# Patient Record
Sex: MALE | Race: WHITE | NOT HISPANIC OR LATINO | Employment: OTHER | URBAN - NONMETROPOLITAN AREA
[De-identification: names, ages, dates, MRNs, and addresses within clinical notes are randomized per-mention and may not be internally consistent; named-entity substitution may affect disease eponyms.]

---

## 2023-11-05 PROBLEM — E66.9 CLASS 2 OBESITY WITH BODY MASS INDEX (BMI) OF 36.0 TO 36.9 IN ADULT: Status: ACTIVE | Noted: 2023-11-05

## 2023-11-05 PROBLEM — M06.9 RHEUMATOID ARTHRITIS (MULTI): Status: ACTIVE | Noted: 2023-11-05

## 2023-11-05 PROBLEM — I10 ESSENTIAL HYPERTENSION: Status: ACTIVE | Noted: 2023-11-05

## 2023-11-05 PROBLEM — E66.812 CLASS 2 OBESITY WITH BODY MASS INDEX (BMI) OF 36.0 TO 36.9 IN ADULT: Status: ACTIVE | Noted: 2023-11-05

## 2023-11-05 PROBLEM — I31.9 PERICARDITIS (HHS-HCC): Status: ACTIVE | Noted: 2023-11-05

## 2023-11-05 PROBLEM — E78.5 HYPERLIPIDEMIA: Status: ACTIVE | Noted: 2023-11-05

## 2023-11-05 RX ORDER — ROSUVASTATIN CALCIUM 40 MG/1
40 TABLET, COATED ORAL DAILY
COMMUNITY
Start: 2021-10-20 | End: 2024-02-27 | Stop reason: ALTCHOICE

## 2023-11-05 RX ORDER — CITALOPRAM 20 MG/1
20 TABLET, FILM COATED ORAL DAILY
COMMUNITY

## 2023-11-05 RX ORDER — VERAPAMIL HYDROCHLORIDE 80 MG/1
1 TABLET ORAL 3 TIMES DAILY
COMMUNITY
Start: 2021-10-20 | End: 2024-02-27 | Stop reason: ALTCHOICE

## 2023-11-05 RX ORDER — GLUCOSAM/CHONDRO/HERB 149/HYAL 750-100 MG
2 TABLET ORAL 2 TIMES DAILY
COMMUNITY

## 2023-11-05 RX ORDER — LISINOPRIL 20 MG/1
1 TABLET ORAL DAILY
COMMUNITY
Start: 2021-10-20 | End: 2024-02-27 | Stop reason: DRUGHIGH

## 2023-11-05 RX ORDER — OMEPRAZOLE 20 MG/1
20 CAPSULE, DELAYED RELEASE ORAL
COMMUNITY
Start: 2021-10-20

## 2023-11-05 RX ORDER — BUSPIRONE HYDROCHLORIDE 10 MG/1
10 TABLET ORAL 3 TIMES DAILY
COMMUNITY

## 2023-11-05 RX ORDER — ALLOPURINOL 100 MG/1
1 TABLET ORAL DAILY
COMMUNITY
Start: 2021-10-20

## 2023-11-05 RX ORDER — NABUMETONE 750 MG/1
1 TABLET, FILM COATED ORAL EVERY 12 HOURS
COMMUNITY
Start: 2021-10-20

## 2023-11-05 RX ORDER — FLUTICASONE PROPIONATE 50 MCG
2 SPRAY, SUSPENSION (ML) NASAL 2 TIMES DAILY
COMMUNITY
Start: 2021-10-20

## 2023-11-07 ENCOUNTER — OFFICE VISIT (OUTPATIENT)
Dept: CARDIOLOGY | Facility: CLINIC | Age: 68
End: 2023-11-07
Payer: MEDICARE

## 2023-11-07 VITALS
SYSTOLIC BLOOD PRESSURE: 142 MMHG | DIASTOLIC BLOOD PRESSURE: 88 MMHG | BODY MASS INDEX: 36.58 KG/M2 | HEART RATE: 76 BPM | HEIGHT: 69 IN | WEIGHT: 247 LBS

## 2023-11-07 DIAGNOSIS — E78.2 MIXED HYPERLIPIDEMIA: ICD-10-CM

## 2023-11-07 DIAGNOSIS — R06.02 SHORTNESS OF BREATH: Primary | ICD-10-CM

## 2023-11-07 DIAGNOSIS — I31.9 PERICARDITIS, UNSPECIFIED CHRONICITY, UNSPECIFIED TYPE (HHS-HCC): ICD-10-CM

## 2023-11-07 DIAGNOSIS — I10 ESSENTIAL HYPERTENSION: ICD-10-CM

## 2023-11-07 DIAGNOSIS — M06.9 RHEUMATOID ARTHRITIS, INVOLVING UNSPECIFIED SITE, UNSPECIFIED WHETHER RHEUMATOID FACTOR PRESENT (MULTI): ICD-10-CM

## 2023-11-07 PROBLEM — E66.9 CLASS 2 OBESITY WITH BODY MASS INDEX (BMI) OF 36.0 TO 36.9 IN ADULT: Status: RESOLVED | Noted: 2023-11-05 | Resolved: 2023-11-07

## 2023-11-07 PROBLEM — E66.812 CLASS 2 OBESITY WITH BODY MASS INDEX (BMI) OF 36.0 TO 36.9 IN ADULT: Status: RESOLVED | Noted: 2023-11-05 | Resolved: 2023-11-07

## 2023-11-07 PROCEDURE — 3077F SYST BP >= 140 MM HG: CPT | Performed by: INTERNAL MEDICINE

## 2023-11-07 PROCEDURE — 3079F DIAST BP 80-89 MM HG: CPT | Performed by: INTERNAL MEDICINE

## 2023-11-07 PROCEDURE — 1160F RVW MEDS BY RX/DR IN RCRD: CPT | Performed by: INTERNAL MEDICINE

## 2023-11-07 PROCEDURE — 3008F BODY MASS INDEX DOCD: CPT | Performed by: INTERNAL MEDICINE

## 2023-11-07 PROCEDURE — 1036F TOBACCO NON-USER: CPT | Performed by: INTERNAL MEDICINE

## 2023-11-07 PROCEDURE — 99214 OFFICE O/P EST MOD 30 MIN: CPT | Performed by: INTERNAL MEDICINE

## 2023-11-07 PROCEDURE — 1159F MED LIST DOCD IN RCRD: CPT | Performed by: INTERNAL MEDICINE

## 2023-11-07 RX ORDER — HYDROCHLOROTHIAZIDE 12.5 MG/1
12.5 CAPSULE ORAL EVERY MORNING
Qty: 90 CAPSULE | Refills: 3 | Status: SHIPPED | OUTPATIENT
Start: 2023-11-07 | End: 2024-11-06

## 2023-11-07 ASSESSMENT — ENCOUNTER SYMPTOMS: DYSPNEA ON EXERTION: 1

## 2023-11-07 NOTE — PROGRESS NOTES
"Subjective   Kip Mera is a 68 y.o. male       Chief Complaint    Follow-up          HPI   Patient is here for earlier follow-up to evaluate recent development of decreased size sinus and shortness of breath.  He had a history of pericarditis remotely with no recurrence.  History of hypertension and hyperlipidemia.  He recently noticed decreased exercise tolerance and dyspnea on exertion consistent with functional class II.  He denies chest pain.  His blood pressure seem to be slightly elevated.  He has gained about 8 pounds.  Recent chest x-ray was unremarkable.  He denies any anginal symptoms.    ASSESSMENT:      1.  Patient presenting with worsening shortness of breath and decrease exercise tolerance with prior remote history of pericarditis. No recurrence. We suspect this could be either viral or related to rheumatoid arthritis.   2. Hypertension, seems not well controlled  3. Hyperlipidemia,   4. Obesity with 8 pound weight gain  5. Rheumatoid arthritis. He is currently not receiving any treatment.       RECOMMENDATION:      1. The patient was counseled regarding losing weight, exercise, risk factor adjustment.  2. The patient was advised to start hydrochlorothiazide 12.5 mg daily to optimize blood pressure control and I advised him to continue to monitor his blood pressure  3. I reviewed with him his recent lab work and chest x-ray  4.  I suggested an echocardiogram due to his shortness of breath  5.  Follow-up in 6 to 8 weeks for reevaluation     Review of Systems   Cardiovascular:  Positive for dyspnea on exertion.   All other systems reviewed and are negative.         Visit Vitals  /88 (BP Location: Right arm, Patient Position: Sitting)   Pulse 76   Ht 1.753 m (5' 9\")   Wt 112 kg (247 lb)   BMI 36.48 kg/m²   Smoking Status Never   BSA 2.34 m²        Objective   Physical Exam  Constitutional:       Appearance: Normal appearance. He is normal weight.   HENT:      Nose: Nose normal.   Neck:      " Vascular: No carotid bruit.   Cardiovascular:      Rate and Rhythm: Normal rate.      Pulses: Normal pulses.      Heart sounds: Normal heart sounds.   Pulmonary:      Effort: Pulmonary effort is normal.   Abdominal:      General: Bowel sounds are normal.      Palpations: Abdomen is soft.   Genitourinary:     Rectum: Normal.   Musculoskeletal:         General: Normal range of motion.      Cervical back: Normal range of motion.      Right lower leg: No edema.      Left lower leg: No edema.   Skin:     General: Skin is warm and dry.   Neurological:      General: No focal deficit present.      Mental Status: He is alert.   Psychiatric:         Mood and Affect: Mood normal.         Behavior: Behavior normal.         Thought Content: Thought content normal.         Judgment: Judgment normal.         Current Medications    Current Outpatient Medications:     allopurinol (Zyloprim) 100 mg tablet, Take 1 tablet (100 mg) by mouth once daily., Disp: , Rfl:     busPIRone (Buspar) 10 mg tablet, Take 1 tablet (10 mg) by mouth 3 times a day., Disp: , Rfl:     citalopram (CeleXA) 20 mg tablet, Take 1 tablet (20 mg) by mouth once daily., Disp: , Rfl:     fluticasone (Flonase) 50 mcg/actuation nasal spray, Administer 2 sprays into each nostril twice a day., Disp: , Rfl:     lisinopril 20 mg tablet, Take 1 tablet (20 mg) by mouth once daily., Disp: , Rfl:     nabumetone (Relafen) 750 mg tablet, Take 1 tablet (750 mg) by mouth every 12 hours., Disp: , Rfl:     omega 3-dha-epa-fish oil (Fish OiL) 1,000 mg (120 mg-180 mg) capsule, Take 2 tablets by mouth 2 times a day., Disp: , Rfl:     omeprazole (PriLOSEC) 20 mg DR capsule, Take 1 capsule (20 mg) by mouth once daily in the morning. Take before meals., Disp: , Rfl:     rosuvastatin (Crestor) 40 mg tablet, Take 1 tablet (40 mg) by mouth once daily., Disp: , Rfl:     verapamil (Calan) 80 mg tablet, Take 1 tablet (80 mg) by mouth 3 times a day., Disp: , Rfl:     hydroCHLOROthiazide  (Microzide) 12.5 mg capsule, Take 1 capsule (12.5 mg) by mouth once daily in the morning., Disp: 90 capsule, Rfl: 3                     Assessment/Plan   1. Shortness of breath  Basic Metabolic Panel    Transthoracic Echo (TTE) Complete    hydroCHLOROthiazide (Microzide) 12.5 mg capsule      2. Pericarditis, unspecified chronicity, unspecified type        3. Essential hypertension  Basic Metabolic Panel    Transthoracic Echo (TTE) Complete    hydroCHLOROthiazide (Microzide) 12.5 mg capsule      4. Mixed hyperlipidemia        5. BMI 36.0-36.9,adult        6. Rheumatoid arthritis, involving unspecified site, unspecified whether rheumatoid factor present (CMS/HCC)

## 2023-11-20 LAB
NON-UH HIE ANION GAP: 12.9 (ref 6–15)
NON-UH HIE BLOOD UREA NITROGEN: 24 MG/DL (ref 7–25)
NON-UH HIE CALCIUM: 9.9 MG/DL (ref 8.6–10.3)
NON-UH HIE CARBON DIOXIDE: 32.1 MMOL/L (ref 21–31)
NON-UH HIE CHLORIDE: 97 MMOL/L (ref 98–107)
NON-UH HIE CREATININE: 1.2 MG/DL (ref 0.7–1.3)
NON-UH HIE ESTIMATED GFR: > 60
NON-UH HIE GLUCOSE: 94 MG/DL (ref 70–100)
NON-UH HIE POTASSIUM: 5 MMOL/L (ref 3.5–5.1)
NON-UH HIE SODIUM: 137 MMOL/L (ref 136–145)

## 2023-12-04 ENCOUNTER — HOSPITAL ENCOUNTER (OUTPATIENT)
Dept: CARDIOLOGY | Facility: CLINIC | Age: 68
Discharge: HOME | End: 2023-12-04
Payer: MEDICARE

## 2023-12-04 VITALS
SYSTOLIC BLOOD PRESSURE: 144 MMHG | BODY MASS INDEX: 36.58 KG/M2 | HEIGHT: 69 IN | DIASTOLIC BLOOD PRESSURE: 84 MMHG | WEIGHT: 247 LBS

## 2023-12-04 DIAGNOSIS — I10 ESSENTIAL HYPERTENSION: ICD-10-CM

## 2023-12-04 DIAGNOSIS — R06.02 SHORTNESS OF BREATH: ICD-10-CM

## 2023-12-04 PROCEDURE — 93306 TTE W/DOPPLER COMPLETE: CPT

## 2023-12-04 PROCEDURE — 93306 TTE W/DOPPLER COMPLETE: CPT | Performed by: INTERNAL MEDICINE

## 2023-12-09 LAB
AORTIC VALVE MEAN GRADIENT: 5
AORTIC VALVE PEAK VELOCITY: 1.61
AV PEAK GRADIENT: 10.4
AVA (PEAK VEL): 2.33
AVA (VTI): 2.65
EJECTION FRACTION APICAL 4 CHAMBER: 69.7
LEFT VENTRICLE INTERNAL DIMENSION DIASTOLE: 4.6 (ref 3.5–6)
LEFT VENTRICULAR OUTFLOW TRACT DIAMETER: 2.3
MITRAL VALVE E/A RATIO: 1.15
MITRAL VALVE E/E' RATIO: 7.3

## 2024-02-14 ENCOUNTER — DOCUMENTATION (OUTPATIENT)
Dept: CARDIOLOGY | Facility: CLINIC | Age: 69
End: 2024-02-14
Payer: MEDICARE

## 2024-02-15 ENCOUNTER — PATIENT OUTREACH (OUTPATIENT)
Dept: CARDIOLOGY | Facility: CLINIC | Age: 69
End: 2024-02-15
Payer: MEDICARE

## 2024-02-15 NOTE — PROGRESS NOTES
Discharge Facility: [facility]  Discharge Diagnosis: NSTEMI  Admission Date:  2/8/24  Discharge Date:  2/14/24    PCP Appointment Date: Encouraged to make an appt    Specialist Appointment Date: Dr Christensen 2/27/24    Hospital Encounter and Summary: Linked   See discharge assessment below for further details     Engagement  Call Start Time: 0916 (2/15/2024  9:22 AM)    Medications  Medications reviewed with patient/caregiver?: Yes (2/15/2024  9:22 AM)  Is the patient having any side effects they believe may be caused by any medication additions or changes?: No (2/15/2024  9:22 AM)  Does the patient have all medications ordered at discharge?: Yes (Picking up the rest this afternoon) (2/15/2024  9:22 AM)  Is the patient taking all medications as directed (includes completed medication regime)?: Yes (2/15/2024  9:22 AM)    Appointments  Does the patient have a primary care provider?: Yes (Encouraged patient to make an appt) (2/15/2024  9:22 AM)  Follow Up Tasks: Appointments (2/15/2024  9:22 AM)    Self Management  What is the home health agency?: n/a (2/15/2024  9:22 AM)  What Durable Medical Equipment (DME) was ordered?: n/a (2/15/2024  9:22 AM)    Patient Teaching  Does the patient have access to their discharge instructions?: Yes (2/15/2024  9:22 AM)  Care Management Interventions: Reviewed instructions with patient (2/15/2024  9:22 AM)  What is the patient's perception of their health status since discharge?: Improving (2/15/2024  9:22 AM)  Is the patient/caregiver able to teach back the hierarchy of who to call/visit for symptoms/problems? PCP, Specialist, Home Health nurse, Urgent Care, ED, 911: Yes (2/15/2024  9:22 AM)  Patient/Caregiver Education Comments: Reviewed monitoring incision site to right wrist, REviewed Brilinta (2/15/2024  9:22 AM)    Wrap Up  Wrap Up Additional Comments: Patient denies CP or SOB. He states his Right wrist looks better today and has no drainage--patient continues with bandage at  this time for comfort. Patient was given medications from the hospital and will  the rest. Patient is aware of appointment with Dr Christensen (2/15/2024  9:22 AM)  Call End Time: 0929 (2/15/2024  9:22 AM)

## 2024-02-27 ENCOUNTER — OFFICE VISIT (OUTPATIENT)
Dept: CARDIOLOGY | Facility: CLINIC | Age: 69
End: 2024-02-27
Payer: MEDICARE

## 2024-02-27 VITALS
BODY MASS INDEX: 36.73 KG/M2 | HEIGHT: 69 IN | WEIGHT: 248 LBS | DIASTOLIC BLOOD PRESSURE: 76 MMHG | SYSTOLIC BLOOD PRESSURE: 132 MMHG | HEART RATE: 68 BPM

## 2024-02-27 DIAGNOSIS — I31.9 PERICARDITIS, UNSPECIFIED CHRONICITY, UNSPECIFIED TYPE (HHS-HCC): ICD-10-CM

## 2024-02-27 DIAGNOSIS — R06.02 SHORTNESS OF BREATH: ICD-10-CM

## 2024-02-27 DIAGNOSIS — Z78.9 NEVER SMOKED TOBACCO: ICD-10-CM

## 2024-02-27 DIAGNOSIS — I10 ESSENTIAL HYPERTENSION: ICD-10-CM

## 2024-02-27 DIAGNOSIS — G47.30 SLEEP APNEA, UNSPECIFIED TYPE: ICD-10-CM

## 2024-02-27 DIAGNOSIS — I21.4 NON Q WAVE MYOCARDIAL INFARCTION (MULTI): ICD-10-CM

## 2024-02-27 DIAGNOSIS — E78.2 MIXED HYPERLIPIDEMIA: ICD-10-CM

## 2024-02-27 DIAGNOSIS — I25.10 SINGLE VESSEL CORONARY ARTERY DISEASE: Primary | ICD-10-CM

## 2024-02-27 PROCEDURE — 1036F TOBACCO NON-USER: CPT | Performed by: INTERNAL MEDICINE

## 2024-02-27 PROCEDURE — 3008F BODY MASS INDEX DOCD: CPT | Performed by: INTERNAL MEDICINE

## 2024-02-27 PROCEDURE — 99495 TRANSJ CARE MGMT MOD F2F 14D: CPT | Performed by: INTERNAL MEDICINE

## 2024-02-27 PROCEDURE — 1160F RVW MEDS BY RX/DR IN RCRD: CPT | Performed by: INTERNAL MEDICINE

## 2024-02-27 PROCEDURE — 3078F DIAST BP <80 MM HG: CPT | Performed by: INTERNAL MEDICINE

## 2024-02-27 PROCEDURE — 3075F SYST BP GE 130 - 139MM HG: CPT | Performed by: INTERNAL MEDICINE

## 2024-02-27 PROCEDURE — 1159F MED LIST DOCD IN RCRD: CPT | Performed by: INTERNAL MEDICINE

## 2024-02-27 RX ORDER — NITROGLYCERIN 0.4 MG/1
TABLET SUBLINGUAL
COMMUNITY
Start: 2024-02-14

## 2024-02-27 RX ORDER — LISINOPRIL 10 MG/1
10 TABLET ORAL DAILY
Qty: 90 TABLET | Refills: 3 | Status: SHIPPED | OUTPATIENT
Start: 2024-02-27 | End: 2025-02-26

## 2024-02-27 RX ORDER — NAPROXEN SODIUM 220 MG/1
81 TABLET, FILM COATED ORAL DAILY
COMMUNITY
Start: 2024-02-14

## 2024-02-27 RX ORDER — ATORVASTATIN CALCIUM 80 MG/1
80 TABLET, FILM COATED ORAL NIGHTLY
COMMUNITY
Start: 2024-02-14

## 2024-02-27 ASSESSMENT — ENCOUNTER SYMPTOMS
SHORTNESS OF BREATH: 1
DIZZINESS: 1

## 2024-02-27 NOTE — PROGRESS NOTES
Subjective   Kip Mera is a 68 y.o. male       Chief Complaint    Follow-up          HPI       Patient is here for follow-up continue management for recent hospitalization for acute coronary syndrome and finding of single-vessel coronary artery disease affecting the LAD.  I saw him recently for symptoms shortness of breath at that point of time he denies any chest pain.  His echocardiogram showed normal LV systolic function.  He presented subsequently with symptoms chest pain and non-Q wave myocardial infarction.  He underwent PCI to the LAD.  Postoperatively he continued to complain of chest pain and redo limited cardiac catheterization showed widely patent LAD stent.  Since his discharge he report mild shortness of breath but denies any chest pain.  I was concerned that some of his shortness of breath could be related to Brilinta.  Patient has been describing episodes of orthostatic hypotension      ASSESSMENT:      1.  Recent evaluation for shortness of breath and chest pain with recent hospitalization for acute coronary syndrome with finding of LAD disease underwent successful PCI to the LAD  2.  Remote history of pericarditis with no recurrence  2. Hypertension, seems to report symptoms of orthostatic hypotensio3  4. Hyperlipidemia, currently on high-dose high intensity statin  5. Obesity with no significant weight changes  5. Rheumatoid arthritis. He is currently not receiving any treatment.  6.  Mild shortness of breath could be related to Brilinta        RECOMMENDATION:      1. The patient was counseled regarding losing weight, exercise, risk factor adjustment.  2.  I advised him to cut down his lisinopril by half and to notify me if his lightheadedness and dizziness does not improve  3. I reviewed with him his recent hospitalization workup  4.  I suggested if his shortness of does not improve to notify me and we will switch him to Plavix  5.  I recommended cardiac rehab  6.  Follow-up in 3 to 4  "months  Review of Systems   Respiratory:  Positive for shortness of breath.    Neurological:  Positive for dizziness.   All other systems reviewed and are negative.           Vitals:    02/27/24 1538   BP: 132/76   BP Location: Right arm   Patient Position: Sitting   Pulse: 68   Weight: 112 kg (248 lb)   Height: 1.753 m (5' 9\")        Objective   Physical Exam  Constitutional:       Appearance: Normal appearance.   HENT:      Nose: Nose normal.   Neck:      Vascular: No carotid bruit.   Cardiovascular:      Rate and Rhythm: Normal rate.      Pulses: Normal pulses.      Heart sounds: Normal heart sounds.   Pulmonary:      Effort: Pulmonary effort is normal.   Abdominal:      General: Bowel sounds are normal.      Palpations: Abdomen is soft.   Musculoskeletal:         General: Normal range of motion.      Cervical back: Normal range of motion.      Right lower leg: No edema.      Left lower leg: No edema.   Skin:     General: Skin is warm and dry.   Neurological:      General: No focal deficit present.      Mental Status: He is alert.   Psychiatric:         Mood and Affect: Mood normal.         Behavior: Behavior normal.         Thought Content: Thought content normal.         Judgment: Judgment normal.         Allergies  Patient has no known allergies.     Current Medications    Current Outpatient Medications:     allopurinol (Zyloprim) 100 mg tablet, Take 1 tablet (100 mg) by mouth once daily., Disp: , Rfl:     aspirin 81 mg chewable tablet, 1 tablet (81 mg) once daily., Disp: , Rfl:     atorvastatin (Lipitor) 80 mg tablet, 1 tablet (80 mg) once daily at bedtime., Disp: , Rfl:     busPIRone (Buspar) 10 mg tablet, Take 1 tablet (10 mg) by mouth 3 times a day., Disp: , Rfl:     citalopram (CeleXA) 20 mg tablet, Take 1 tablet (20 mg) by mouth once daily., Disp: , Rfl:     fluticasone (Flonase) 50 mcg/actuation nasal spray, Administer 2 sprays into each nostril twice a day., Disp: , Rfl:     hydroCHLOROthiazide " (Microzide) 12.5 mg capsule, Take 1 capsule (12.5 mg) by mouth once daily in the morning., Disp: 90 capsule, Rfl: 3    nabumetone (Relafen) 750 mg tablet, Take 1 tablet (750 mg) by mouth every 12 hours., Disp: , Rfl:     nitroglycerin (Nitrostat) 0.4 mg SL tablet, Q5M, Disp: , Rfl:     omega 3-dha-epa-fish oil (Fish OiL) 1,000 mg (120 mg-180 mg) capsule, Take 2 tablets by mouth 2 times a day., Disp: , Rfl:     omeprazole (PriLOSEC) 20 mg DR capsule, Take 1 capsule (20 mg) by mouth once daily in the morning. Take before meals., Disp: , Rfl:     ticagrelor (Brilinta) 90 mg tablet, 1 tablet (90 mg) 2 times a day., Disp: , Rfl:     lisinopril 10 mg tablet, Take 1 tablet (10 mg) by mouth once daily., Disp: 90 tablet, Rfl: 3                     Assessment/Plan   1. Single vessel coronary artery disease  Follow Up In Cardiology    Referral to Cardiac Rehab      2. Essential hypertension  lisinopril 10 mg tablet      3. Mixed hyperlipidemia        4. Pericarditis, unspecified chronicity, unspecified type        5. BMI 36.0-36.9,adult        6. Shortness of breath        7. Non Q wave myocardial infarction (CMS/HCC)  Referral to Cardiac Rehab      8. Sleep apnea, unspecified type        9. Never smoked tobacco                 Scribe Attestation  By signing my name below, Iharjeet Scribe   attest that this documentation has been prepared under the direction and in the presence of Josie Choi MD.     Provider Attestation - Scribe documentation    All medical record entries made by the Scribe were at my direction and personally dictated by me. I have reviewed the chart and agree that the record accurately reflects my personal performance of the history, physical exam, discussion and plan.

## 2024-02-27 NOTE — PATIENT INSTRUCTIONS
Please bring all medicines, vitamins, and herbal supplements with you when you come to the office.    Prescriptions will not be filled unless you are compliant with your follow up appointments or have a follow up appointment scheduled as per instruction of your physician. Refills should be requested at the time of your visit.     BMI was above normal measurement. Current weight: 112 kg (248 lb)  Weight change since last visit (-) denotes wt loss 1 lbs   Weight loss needed to achieve BMI 25: 79.1 Lbs  Weight loss needed to achieve BMI 30: 45.3 Lbs  Provided instructions on dietary changes  Provided instructions on exercise.

## 2024-02-29 ENCOUNTER — PATIENT OUTREACH (OUTPATIENT)
Dept: CARDIOLOGY | Facility: CLINIC | Age: 69
End: 2024-02-29
Payer: MEDICARE

## 2024-02-29 NOTE — PROGRESS NOTES
Call regarding appt. with cardiology on 2/27/24 after hospitalization.  At time of outreach call the patient feels as if their condition has improved since last visit.  Reviewed the PCP appointment with the pt and addressed any questions or concerns.     Patient has reduced his lisinopril and continues with lightheadedness. Patient states it has only been one day, but if it continues he aware to call the office for another medication changed to possibly his Brilinta

## 2024-03-28 ENCOUNTER — PATIENT OUTREACH (OUTPATIENT)
Dept: CARDIOLOGY | Facility: CLINIC | Age: 69
End: 2024-03-28
Payer: MEDICARE

## 2024-03-28 NOTE — PROGRESS NOTES
Call placed regarding one month post discharge follow up call.  At time of outreach call the patient feels as if their condition has improved since initial visit with PCP or specialist.  Questions or concerns regarding recovery period addressed at this time.   Reviewed any PCP or specialists progress notes/labs/radiology reports if applicable and addressed any questions or concerns.   Reviewed if patient is still feeling lightheaded to call Dr Choi's office

## 2024-04-25 ENCOUNTER — PATIENT OUTREACH (OUTPATIENT)
Dept: CARDIOLOGY | Facility: CLINIC | Age: 69
End: 2024-04-25
Payer: MEDICARE

## 2024-04-25 NOTE — PROGRESS NOTES
90 day call  This CM called and spoke with patient to address any final questions or concerns regarding hospitalization. Patient reports doing well and has no further concerns

## 2024-05-03 ENCOUNTER — TELEPHONE (OUTPATIENT)
Dept: CARDIOLOGY | Facility: CLINIC | Age: 69
End: 2024-05-03
Payer: MEDICARE

## 2024-05-03 DIAGNOSIS — I21.4 NON Q WAVE MYOCARDIAL INFARCTION (MULTI): ICD-10-CM

## 2024-05-03 NOTE — TELEPHONE ENCOUNTER
Patient called to report that he is often SOB and he thinks its from the Brilinta and was inquiring on if there was something else he could take or if he could stop taking it.    Please advise

## 2024-05-08 RX ORDER — CLOPIDOGREL BISULFATE 75 MG/1
75 TABLET ORAL DAILY
Qty: 90 TABLET | Refills: 3 | Status: SHIPPED | OUTPATIENT
Start: 2024-05-08 | End: 2025-05-08

## 2024-05-08 RX ORDER — CLOPIDOGREL BISULFATE 300 MG/1
300 TABLET, FILM COATED ORAL ONCE
Qty: 1 TABLET | Refills: 0 | Status: SHIPPED | OUTPATIENT
Start: 2024-05-08 | End: 2024-05-08

## 2024-06-21 ENCOUNTER — APPOINTMENT (OUTPATIENT)
Dept: CARDIOLOGY | Facility: CLINIC | Age: 69
End: 2024-06-21
Payer: MEDICARE

## 2024-07-08 ENCOUNTER — APPOINTMENT (OUTPATIENT)
Dept: CARDIOLOGY | Facility: CLINIC | Age: 69
End: 2024-07-08
Payer: MEDICARE

## 2024-07-08 VITALS
SYSTOLIC BLOOD PRESSURE: 110 MMHG | HEART RATE: 80 BPM | DIASTOLIC BLOOD PRESSURE: 68 MMHG | WEIGHT: 240 LBS | BODY MASS INDEX: 35.55 KG/M2 | HEIGHT: 69 IN

## 2024-07-08 DIAGNOSIS — I25.10 SINGLE VESSEL CORONARY ARTERY DISEASE: ICD-10-CM

## 2024-07-08 DIAGNOSIS — I10 ESSENTIAL HYPERTENSION: Primary | ICD-10-CM

## 2024-07-08 DIAGNOSIS — E78.2 MIXED HYPERLIPIDEMIA: ICD-10-CM

## 2024-07-08 DIAGNOSIS — R06.02 SHORTNESS OF BREATH: ICD-10-CM

## 2024-07-08 DIAGNOSIS — I31.9 PERICARDITIS, UNSPECIFIED CHRONICITY, UNSPECIFIED TYPE (HHS-HCC): ICD-10-CM

## 2024-07-08 DIAGNOSIS — I21.4 NON Q WAVE MYOCARDIAL INFARCTION (MULTI): ICD-10-CM

## 2024-07-08 DIAGNOSIS — Z78.9 NEVER SMOKED TOBACCO: ICD-10-CM

## 2024-07-08 DIAGNOSIS — M06.9 RHEUMATOID ARTHRITIS, INVOLVING UNSPECIFIED SITE, UNSPECIFIED WHETHER RHEUMATOID FACTOR PRESENT (MULTI): ICD-10-CM

## 2024-07-08 DIAGNOSIS — G47.33 OBSTRUCTIVE SLEEP APNEA SYNDROME: ICD-10-CM

## 2024-07-08 PROCEDURE — 3008F BODY MASS INDEX DOCD: CPT | Performed by: INTERNAL MEDICINE

## 2024-07-08 PROCEDURE — 99214 OFFICE O/P EST MOD 30 MIN: CPT | Performed by: INTERNAL MEDICINE

## 2024-07-08 PROCEDURE — 1036F TOBACCO NON-USER: CPT | Performed by: INTERNAL MEDICINE

## 2024-07-08 PROCEDURE — 1159F MED LIST DOCD IN RCRD: CPT | Performed by: INTERNAL MEDICINE

## 2024-07-08 PROCEDURE — 3078F DIAST BP <80 MM HG: CPT | Performed by: INTERNAL MEDICINE

## 2024-07-08 PROCEDURE — 1160F RVW MEDS BY RX/DR IN RCRD: CPT | Performed by: INTERNAL MEDICINE

## 2024-07-08 PROCEDURE — 3074F SYST BP LT 130 MM HG: CPT | Performed by: INTERNAL MEDICINE

## 2024-07-08 RX ORDER — CARVEDILOL 6.25 MG/1
6.25 TABLET ORAL 2 TIMES DAILY
COMMUNITY
Start: 2024-06-14 | End: 2024-07-08 | Stop reason: SDUPTHER

## 2024-07-08 RX ORDER — CLOPIDOGREL BISULFATE 75 MG/1
75 TABLET ORAL DAILY
Qty: 90 TABLET | Refills: 3 | Status: SHIPPED | OUTPATIENT
Start: 2024-07-08 | End: 2025-07-08

## 2024-07-08 RX ORDER — CARVEDILOL 6.25 MG/1
6.25 TABLET ORAL 2 TIMES DAILY
Qty: 180 TABLET | Refills: 3 | Status: SHIPPED | OUTPATIENT
Start: 2024-07-08 | End: 2025-07-08

## 2024-07-08 RX ORDER — LISINOPRIL 10 MG/1
10 TABLET ORAL DAILY
Qty: 90 TABLET | Refills: 3 | Status: SHIPPED | OUTPATIENT
Start: 2024-07-08 | End: 2025-07-08

## 2024-07-08 RX ORDER — HYDROCHLOROTHIAZIDE 12.5 MG/1
12.5 CAPSULE ORAL EVERY MORNING
Qty: 90 CAPSULE | Refills: 3 | Status: SHIPPED | OUTPATIENT
Start: 2024-07-08 | End: 2025-07-08

## 2024-07-08 RX ORDER — ATORVASTATIN CALCIUM 80 MG/1
80 TABLET, FILM COATED ORAL NIGHTLY
Qty: 90 TABLET | Refills: 3 | Status: SHIPPED | OUTPATIENT
Start: 2024-07-08 | End: 2025-07-08

## 2024-07-08 ASSESSMENT — ENCOUNTER SYMPTOMS: LIGHT-HEADEDNESS: 1

## 2024-07-08 NOTE — PROGRESS NOTES
"Subjective   Kip Mera is a 68 y.o. male       Chief Complaint    Follow-up          HPI        Patient is here for follow-up continue management for coronary artery disease with recent PCI to the LAD, hypertension, hyperlipidemia, obesity.  Since last time I saw him he denies any cardiac complaint.  He reports he twisted his knee.  He is compliant with his medication.  He denies complaint of chest pain, palpitation, lightheadedness, dizziness or syncope.    ASSESSMENT:      1.  Coronary artery disease with recent PCI to the LAD.  Asymptomatic functional class I  2.  Remote history of pericarditis with no recurrence  2. Hypertension, controlled  4. Hyperlipidemia, currently on high-dose high intensity statin  5. Obesity with no significant weight changes  5. Rheumatoid arthritis. He is currently not receiving any treatment.  6.  Mild shortness of breath improved after switching his Brilinta to Plavix     RECOMMENDATION:      1. The patient was counseled regarding losing weight, exercise, risk factor adjustment.  2.  I advised him to repeat his lab work  3. I reviewed with him his recent hospitalization workup  4.  I we will see him back in the office in 6 months  Review of Systems   Neurological:  Positive for light-headedness.   All other systems reviewed and are negative.           Vitals:    07/08/24 1444   BP: 110/68   BP Location: Right arm   Patient Position: Sitting   Pulse: 80   Weight: 109 kg (240 lb)   Height: 1.753 m (5' 9\")        Objective   Physical Exam  Constitutional:       Appearance: Normal appearance.   HENT:      Nose: Nose normal.   Neck:      Vascular: No carotid bruit.   Cardiovascular:      Rate and Rhythm: Normal rate.      Pulses: Normal pulses.      Heart sounds: Normal heart sounds.   Pulmonary:      Effort: Pulmonary effort is normal.   Abdominal:      General: Bowel sounds are normal.      Palpations: Abdomen is soft.   Musculoskeletal:         General: Normal range of motion.    " Spoke with mother, I told her that  said the Lamictal level needs to be higher.  Please add 25 mg in the am for 1 week and the 25mg in the morning and night.  Will check levels in 3 weeks.  Mom is requesting a refill on the Lamictal.  Mom verbalized understandings.     Cervical back: Normal range of motion.      Right lower leg: No edema.      Left lower leg: No edema.   Skin:     General: Skin is warm and dry.   Neurological:      General: No focal deficit present.      Mental Status: He is alert.   Psychiatric:         Mood and Affect: Mood normal.         Behavior: Behavior normal.         Thought Content: Thought content normal.         Judgment: Judgment normal.         Allergies  Patient has no known allergies.     Current Medications    Current Outpatient Medications:     allopurinol (Zyloprim) 100 mg tablet, Take 1 tablet (100 mg) by mouth once daily., Disp: , Rfl:     aspirin 81 mg chewable tablet, 1 tablet (81 mg) once daily., Disp: , Rfl:     atorvastatin (Lipitor) 80 mg tablet, 1 tablet (80 mg) once daily at bedtime., Disp: , Rfl:     busPIRone (Buspar) 10 mg tablet, Take 1 tablet (10 mg) by mouth 3 times a day., Disp: , Rfl:     carvedilol (Coreg) 6.25 mg tablet, Take 1 tablet (6.25 mg) by mouth 2 times a day., Disp: , Rfl:     citalopram (CeleXA) 20 mg tablet, Take 1 tablet (20 mg) by mouth once daily., Disp: , Rfl:     clopidogrel (Plavix) 75 mg tablet, Take 1 tablet (75 mg) by mouth once daily., Disp: 90 tablet, Rfl: 3    fluticasone (Flonase) 50 mcg/actuation nasal spray, Administer 2 sprays into each nostril twice a day., Disp: , Rfl:     hydroCHLOROthiazide (Microzide) 12.5 mg capsule, Take 1 capsule (12.5 mg) by mouth once daily in the morning., Disp: 90 capsule, Rfl: 3    lisinopril 10 mg tablet, Take 1 tablet (10 mg) by mouth once daily., Disp: 90 tablet, Rfl: 3    multivitamin with minerals iron-free (Centrum Silver), Take 1 tablet by mouth once daily., Disp: , Rfl:     nabumetone (Relafen) 750 mg tablet, Take 1 tablet (750 mg) by mouth every 12 hours., Disp: , Rfl:     nitroglycerin (Nitrostat) 0.4 mg SL tablet, Q5M, Disp: , Rfl:     omega 3-dha-epa-fish oil (Fish OiL) 1,000 mg (120 mg-180 mg) capsule, Take 2 tablets by mouth 2 times a day., Disp: , Rfl:      omeprazole (PriLOSEC) 20 mg DR capsule, Take 1 capsule (20 mg) by mouth once daily in the morning. Take before meals., Disp: , Rfl:                      Assessment/Plan   1. Essential hypertension        2. Single vessel coronary artery disease  Follow Up In Cardiology      3. Mixed hyperlipidemia        4. Pericarditis, unspecified chronicity, unspecified type ([facility])        5. BMI 35.0-35.9,adult        6. Rheumatoid arthritis, involving unspecified site, unspecified whether rheumatoid factor present ([facility])        7. Shortness of breath        8. Obstructive sleep apnea syndrome        9. Never smoked tobacco                 Scribe Attestation  By signing my name below, IClaudia LPN , Scribe   attest that this documentation has been prepared under the direction and in the presence of Josie Choi MD.     Provider Attestation - Scribe documentation    All medical record entries made by the Scribe were at my direction and personally dictated by me. I have reviewed the chart and agree that the record accurately reflects my personal performance of the history, physical exam, discussion and plan.

## 2024-07-08 NOTE — PATIENT INSTRUCTIONS
Please bring all medicines, vitamins, and herbal supplements with you when you come to the office.    Prescriptions will not be filled unless you are compliant with your follow up appointments or have a follow up appointment scheduled as per instruction of your physician. Refills should be requested at the time of your visit.     BMI was above normal measurement. Current weight: 109 kg (240 lb)  Weight change since last visit (-) denotes wt loss -8 lbs   Weight loss needed to achieve BMI 25: 71.1 Lbs  Weight loss needed to achieve BMI 30: 37.3 Lbs  Provided instructions on dietary changes.

## 2025-01-21 ENCOUNTER — APPOINTMENT (OUTPATIENT)
Dept: CARDIOLOGY | Facility: CLINIC | Age: 70
End: 2025-01-21
Payer: MEDICARE

## 2025-01-21 VITALS
SYSTOLIC BLOOD PRESSURE: 110 MMHG | HEART RATE: 80 BPM | HEIGHT: 69 IN | BODY MASS INDEX: 37.98 KG/M2 | WEIGHT: 256.4 LBS | DIASTOLIC BLOOD PRESSURE: 62 MMHG

## 2025-01-21 DIAGNOSIS — I10 ESSENTIAL HYPERTENSION: ICD-10-CM

## 2025-01-21 DIAGNOSIS — I25.10 SINGLE VESSEL CORONARY ARTERY DISEASE: Primary | ICD-10-CM

## 2025-01-21 DIAGNOSIS — I31.9 PERICARDITIS, UNSPECIFIED CHRONICITY, UNSPECIFIED TYPE (HHS-HCC): ICD-10-CM

## 2025-01-21 DIAGNOSIS — G47.33 OBSTRUCTIVE SLEEP APNEA SYNDROME: ICD-10-CM

## 2025-01-21 DIAGNOSIS — R06.02 SHORTNESS OF BREATH: ICD-10-CM

## 2025-01-21 DIAGNOSIS — Z78.9 NEVER SMOKED TOBACCO: ICD-10-CM

## 2025-01-21 DIAGNOSIS — E78.2 MIXED HYPERLIPIDEMIA: ICD-10-CM

## 2025-01-21 PROCEDURE — 3074F SYST BP LT 130 MM HG: CPT | Performed by: INTERNAL MEDICINE

## 2025-01-21 PROCEDURE — 1159F MED LIST DOCD IN RCRD: CPT | Performed by: INTERNAL MEDICINE

## 2025-01-21 PROCEDURE — 1036F TOBACCO NON-USER: CPT | Performed by: INTERNAL MEDICINE

## 2025-01-21 PROCEDURE — 99214 OFFICE O/P EST MOD 30 MIN: CPT | Performed by: INTERNAL MEDICINE

## 2025-01-21 PROCEDURE — 3008F BODY MASS INDEX DOCD: CPT | Performed by: INTERNAL MEDICINE

## 2025-01-21 PROCEDURE — 1160F RVW MEDS BY RX/DR IN RCRD: CPT | Performed by: INTERNAL MEDICINE

## 2025-01-21 PROCEDURE — 3078F DIAST BP <80 MM HG: CPT | Performed by: INTERNAL MEDICINE

## 2025-01-21 RX ORDER — GABAPENTIN 300 MG/1
300 CAPSULE ORAL 2 TIMES DAILY
COMMUNITY
Start: 2025-01-16

## 2025-01-21 RX ORDER — PREGABALIN 50 MG/1
50 CAPSULE ORAL 2 TIMES DAILY
COMMUNITY

## 2025-01-21 RX ORDER — HYDROXYCHLOROQUINE SULFATE 200 MG/1
1 TABLET, FILM COATED ORAL
COMMUNITY
Start: 2025-01-16

## 2025-01-21 ASSESSMENT — ENCOUNTER SYMPTOMS: DYSPNEA ON EXERTION: 1

## 2025-01-21 NOTE — PROGRESS NOTES
"Subjective   Kip Mera is a 69 y.o. male       Chief Complaint    Follow-up          HPI       Patient is here for follow-up continue management for coronary artery disease prior PCI to the LAD.  Repeat heart cath last year showed widely patent LAD, history of hypertension and hyperlipidemia.  Since last time I saw him he denies any cardiac complaint.  He reports he is having some orthopedic issues and neuropathy.  He denies chest pain, lightheadedness, dizziness or syncope but admit to sedentary lifestyle.    ASSESSMENT:      1.  Coronary artery disease with prior t PCI to the LAD.  Asymptomatic functional class I.  Repeat heart cath last year by Dr. Woo showed no restenosis  2.  Remote history of pericarditis with no recurrence  2. Hypertension, controlled  4. Hyperlipidemia, currently on high-dose high intensity statin.  No recent labs  5. Obesity with no significant weight changes  5. Rheumatoid arthritis. He is currently not receiving any treatment.  6.  Mild shortness of breath improved after switching his Brilinta to Plavix  7.  Patient retaining the idea of proceeding with knee surgery     RECOMMENDATION:      1. The patient was counseled regarding losing weight, exercise, risk factor adjustment.  2.  I advised him to repeat his lipid profile  3. I reviewed with him his previous heart cath  4.  I we will see him back in the office in 9-month  5.  I told him he can proceed with surgery if needed he can hold his antiplatelet for 1 week prior to surgery  Review of Systems   Cardiovascular:  Positive for dyspnea on exertion.   All other systems reviewed and are negative.           Vitals:    01/21/25 1422   BP: 110/62   BP Location: Right arm   Patient Position: Sitting   Pulse: 80   Weight: 116 kg (256 lb 6.4 oz)   Height: 1.753 m (5' 9\")        Objective   Physical Exam  Constitutional:       Appearance: Normal appearance.   HENT:      Nose: Nose normal.   Neck:      Vascular: No carotid bruit. "   Cardiovascular:      Rate and Rhythm: Normal rate.      Pulses: Normal pulses.      Heart sounds: Normal heart sounds.   Pulmonary:      Effort: Pulmonary effort is normal.   Abdominal:      General: Bowel sounds are normal.      Palpations: Abdomen is soft.   Musculoskeletal:         General: Normal range of motion.      Cervical back: Normal range of motion.      Right lower leg: No edema.      Left lower leg: No edema.   Skin:     General: Skin is warm and dry.   Neurological:      General: No focal deficit present.      Mental Status: He is alert.   Psychiatric:         Mood and Affect: Mood normal.         Behavior: Behavior normal.         Thought Content: Thought content normal.         Judgment: Judgment normal.         Allergies  Patient has no known allergies.     Current Medications    Current Outpatient Medications:     allopurinol (Zyloprim) 100 mg tablet, Take 1 tablet (100 mg) by mouth once daily., Disp: , Rfl:     aspirin 81 mg chewable tablet, 1 tablet (81 mg) once daily., Disp: , Rfl:     atorvastatin (Lipitor) 80 mg tablet, Take 1 tablet (80 mg) by mouth once daily at bedtime., Disp: 90 tablet, Rfl: 3    busPIRone (Buspar) 10 mg tablet, Take 1 tablet (10 mg) by mouth 2 times a day., Disp: , Rfl:     carvedilol (Coreg) 6.25 mg tablet, Take 1 tablet (6.25 mg) by mouth 2 times a day., Disp: 180 tablet, Rfl: 3    citalopram (CeleXA) 20 mg tablet, Take 1 tablet (20 mg) by mouth once daily., Disp: , Rfl:     clopidogrel (Plavix) 75 mg tablet, Take 1 tablet (75 mg) by mouth once daily., Disp: 90 tablet, Rfl: 3    gabapentin (Neurontin) 300 mg capsule, Take 1 capsule (300 mg) by mouth 2 times a day., Disp: , Rfl:     hydroCHLOROthiazide (Microzide) 12.5 mg capsule, Take 1 capsule (12.5 mg) by mouth once daily in the morning., Disp: 90 capsule, Rfl: 3    hydroxychloroquine (Plaquenil) 200 mg tablet, Take 1 tablet (200 mg) by mouth every 12 hours., Disp: , Rfl:     lisinopril 10 mg tablet, Take 1 tablet  (10 mg) by mouth once daily., Disp: 90 tablet, Rfl: 3    multivitamin with minerals iron-free (Centrum Silver), Take 1 tablet by mouth once daily., Disp: , Rfl:     nitroglycerin (Nitrostat) 0.4 mg SL tablet, Q5M, Disp: , Rfl:     omega 3-dha-epa-fish oil (Fish OiL) 1,000 mg (120 mg-180 mg) capsule, Take 2 tablets by mouth 2 times a day., Disp: , Rfl:     omeprazole (PriLOSEC) 20 mg DR capsule, Take 1 capsule (20 mg) by mouth once daily in the morning. Take before meals., Disp: , Rfl:     pregabalin (Lyrica) 50 mg capsule, Take 1 capsule (50 mg) by mouth 2 times a day., Disp: , Rfl:                      Assessment/Plan   1. Single vessel coronary artery disease  Follow Up In Cardiology      2. Pericarditis, unspecified chronicity, unspecified type ([facility]-HCC)        3. Essential hypertension        4. Mixed hyperlipidemia        5. BMI 35.0-35.9,adult        6. Shortness of breath        7. Obstructive sleep apnea syndrome        8. Never smoked tobacco                 Scribe Attestation  By signing my name below, Armida CLANCY LPN, Scribe   attest that this documentation has been prepared under the direction and in the presence of Josie Choi MD.     Provider Attestation - Scribe documentation    All medical record entries made by the Scribe were at my direction and personally dictated by me. I have reviewed the chart and agree that the record accurately reflects my personal performance of the history, physical exam, discussion and plan.

## 2025-01-21 NOTE — LETTER
January 21, 2025     Indra oLtt DO  [address] [place] [address]    Patient: Kip Mera   YOB: 1955   Date of Visit: 1/21/2025       Dear Dr. Indra Lott DO:    Thank you for referring Kip Mera to me for evaluation. Below are my notes for this consultation.  If you have questions, please do not hesitate to call me. I look forward to following your patient along with you.       Sincerely,     Josie Choi MD      CC: No Recipients  ______________________________________________________________________________________    Subjective   Kip Mera is a 69 y.o. male       Chief Complaint    Follow-up          HPI       Patient is here for follow-up continue management for coronary artery disease prior PCI to the LAD.  Repeat heart cath last year showed widely patent LAD, history of hypertension and hyperlipidemia.  Since last time I saw him he denies any cardiac complaint.  He reports he is having some orthopedic issues and neuropathy.  He denies chest pain, lightheadedness, dizziness or syncope but admit to sedentary lifestyle.    ASSESSMENT:      1.  Coronary artery disease with prior t PCI to the LAD.  Asymptomatic functional class I.  Repeat heart cath last year by Dr. Woo showed no restenosis  2.  Remote history of pericarditis with no recurrence  2. Hypertension, controlled  4. Hyperlipidemia, currently on high-dose high intensity statin.  No recent labs  5. Obesity with no significant weight changes  5. Rheumatoid arthritis. He is currently not receiving any treatment.  6.  Mild shortness of breath improved after switching his Brilinta to Plavix  7.  Patient retaining the idea of proceeding with knee surgery     RECOMMENDATION:      1. The patient was counseled regarding losing weight, exercise, risk factor adjustment.  2.  I advised him to repeat his lipid profile  3. I reviewed with him his previous heart cath  4.  I we will see him back in the office  "in 9-month  5.  I told him he can proceed with surgery if needed he can hold his antiplatelet for 1 week prior to surgery  Review of Systems   Cardiovascular:  Positive for dyspnea on exertion.   All other systems reviewed and are negative.           Vitals:    01/21/25 1422   BP: 110/62   BP Location: Right arm   Patient Position: Sitting   Pulse: 80   Weight: 116 kg (256 lb 6.4 oz)   Height: 1.753 m (5' 9\")        Objective   Physical Exam  Constitutional:       Appearance: Normal appearance.   HENT:      Nose: Nose normal.   Neck:      Vascular: No carotid bruit.   Cardiovascular:      Rate and Rhythm: Normal rate.      Pulses: Normal pulses.      Heart sounds: Normal heart sounds.   Pulmonary:      Effort: Pulmonary effort is normal.   Abdominal:      General: Bowel sounds are normal.      Palpations: Abdomen is soft.   Musculoskeletal:         General: Normal range of motion.      Cervical back: Normal range of motion.      Right lower leg: No edema.      Left lower leg: No edema.   Skin:     General: Skin is warm and dry.   Neurological:      General: No focal deficit present.      Mental Status: He is alert.   Psychiatric:         Mood and Affect: Mood normal.         Behavior: Behavior normal.         Thought Content: Thought content normal.         Judgment: Judgment normal.         Allergies  Patient has no known allergies.     Current Medications    Current Outpatient Medications:   •  allopurinol (Zyloprim) 100 mg tablet, Take 1 tablet (100 mg) by mouth once daily., Disp: , Rfl:   •  aspirin 81 mg chewable tablet, 1 tablet (81 mg) once daily., Disp: , Rfl:   •  atorvastatin (Lipitor) 80 mg tablet, Take 1 tablet (80 mg) by mouth once daily at bedtime., Disp: 90 tablet, Rfl: 3  •  busPIRone (Buspar) 10 mg tablet, Take 1 tablet (10 mg) by mouth 2 times a day., Disp: , Rfl:   •  carvedilol (Coreg) 6.25 mg tablet, Take 1 tablet (6.25 mg) by mouth 2 times a day., Disp: 180 tablet, Rfl: 3  •  citalopram " (CeleXA) 20 mg tablet, Take 1 tablet (20 mg) by mouth once daily., Disp: , Rfl:   •  clopidogrel (Plavix) 75 mg tablet, Take 1 tablet (75 mg) by mouth once daily., Disp: 90 tablet, Rfl: 3  •  gabapentin (Neurontin) 300 mg capsule, Take 1 capsule (300 mg) by mouth 2 times a day., Disp: , Rfl:   •  hydroCHLOROthiazide (Microzide) 12.5 mg capsule, Take 1 capsule (12.5 mg) by mouth once daily in the morning., Disp: 90 capsule, Rfl: 3  •  hydroxychloroquine (Plaquenil) 200 mg tablet, Take 1 tablet (200 mg) by mouth every 12 hours., Disp: , Rfl:   •  lisinopril 10 mg tablet, Take 1 tablet (10 mg) by mouth once daily., Disp: 90 tablet, Rfl: 3  •  multivitamin with minerals iron-free (Centrum Silver), Take 1 tablet by mouth once daily., Disp: , Rfl:   •  nitroglycerin (Nitrostat) 0.4 mg SL tablet, Q5M, Disp: , Rfl:   •  omega 3-dha-epa-fish oil (Fish OiL) 1,000 mg (120 mg-180 mg) capsule, Take 2 tablets by mouth 2 times a day., Disp: , Rfl:   •  omeprazole (PriLOSEC) 20 mg DR capsule, Take 1 capsule (20 mg) by mouth once daily in the morning. Take before meals., Disp: , Rfl:   •  pregabalin (Lyrica) 50 mg capsule, Take 1 capsule (50 mg) by mouth 2 times a day., Disp: , Rfl:                      Assessment/Plan   1. Single vessel coronary artery disease  Follow Up In Cardiology      2. Pericarditis, unspecified chronicity, unspecified type ([facility]-HCC)        3. Essential hypertension        4. Mixed hyperlipidemia        5. BMI 35.0-35.9,adult        6. Shortness of breath        7. Obstructive sleep apnea syndrome        8. Never smoked tobacco                 Scribe Attestation  By signing my name below, Armida CLANCY LPN, Scribe   attest that this documentation has been prepared under the direction and in the presence of Josie Choi MD.     Provider Attestation - Scribe documentation    All medical record entries made by the Scribe were at my direction and personally dictated by me. I have reviewed the chart and  agree that the record accurately reflects my personal performance of the history, physical exam, discussion and plan.

## 2025-01-21 NOTE — PATIENT INSTRUCTIONS
Please bring all medicines, vitamins, and herbal supplements with you when you come to the office.    Prescriptions will not be filled unless you are compliant with your follow up appointments or have a follow up appointment scheduled as per instruction of your physician. Refills should be requested at the time of your visit.     Fall Prevention Education Given     BMI was above normal measurement. Current weight: 116 kg (256 lb 6.4 oz)  Weight change since last visit (-) denotes wt loss 16.4 lbs   Weight loss needed to achieve BMI 25: 87.5 Lbs  Weight loss needed to achieve BMI 30: 53.7 Lbs  Provided instructions on dietary changes  Provided instructions on exercise.    Lipid lab  Same medications  9 months

## 2025-02-06 DIAGNOSIS — I25.10 SINGLE VESSEL CORONARY ARTERY DISEASE: ICD-10-CM

## 2025-02-06 RX ORDER — NAPROXEN SODIUM 220 MG/1
81 TABLET, FILM COATED ORAL DAILY
Qty: 90 TABLET | Refills: 3 | Status: SHIPPED | OUTPATIENT
Start: 2025-02-06 | End: 2026-02-06

## 2025-02-06 NOTE — TELEPHONE ENCOUNTER
Refill request from [facility] received for asa. Left message for patient advising he can take the asa 81 mg otc or can  if insurance covers.

## 2025-05-10 DIAGNOSIS — I21.4 NON Q WAVE MYOCARDIAL INFARCTION (MULTI): ICD-10-CM

## 2025-05-10 DIAGNOSIS — R06.02 SHORTNESS OF BREATH: ICD-10-CM

## 2025-05-10 DIAGNOSIS — I25.10 SINGLE VESSEL CORONARY ARTERY DISEASE: ICD-10-CM

## 2025-05-10 DIAGNOSIS — I10 ESSENTIAL HYPERTENSION: ICD-10-CM

## 2025-05-12 RX ORDER — CLOPIDOGREL BISULFATE 75 MG/1
75 TABLET ORAL DAILY
Qty: 90 TABLET | Refills: 3 | Status: SHIPPED | OUTPATIENT
Start: 2025-05-12

## 2025-05-12 RX ORDER — HYDROCHLOROTHIAZIDE 12.5 MG/1
12.5 CAPSULE ORAL EVERY MORNING
Qty: 90 CAPSULE | Refills: 3 | Status: SHIPPED | OUTPATIENT
Start: 2025-05-12

## 2025-05-12 RX ORDER — CARVEDILOL 6.25 MG/1
6.25 TABLET ORAL 2 TIMES DAILY
Qty: 180 TABLET | Refills: 3 | Status: SHIPPED | OUTPATIENT
Start: 2025-05-12

## 2025-07-06 DIAGNOSIS — I25.10 SINGLE VESSEL CORONARY ARTERY DISEASE: ICD-10-CM

## 2025-07-06 DIAGNOSIS — E78.5 HYPERLIPIDEMIA, UNSPECIFIED HYPERLIPIDEMIA TYPE: ICD-10-CM

## 2025-07-06 DIAGNOSIS — I10 ESSENTIAL HYPERTENSION: ICD-10-CM

## 2025-07-11 RX ORDER — LISINOPRIL 10 MG/1
10 TABLET ORAL DAILY
Qty: 90 TABLET | Refills: 3 | Status: SHIPPED | OUTPATIENT
Start: 2025-07-11

## 2025-07-11 RX ORDER — ATORVASTATIN CALCIUM 80 MG/1
80 TABLET, FILM COATED ORAL NIGHTLY
Qty: 90 TABLET | Refills: 3 | Status: SHIPPED | OUTPATIENT
Start: 2025-07-11

## 2025-07-22 DIAGNOSIS — I25.10 SINGLE VESSEL CORONARY ARTERY DISEASE: ICD-10-CM

## 2025-07-22 DIAGNOSIS — I10 ESSENTIAL HYPERTENSION: ICD-10-CM

## 2025-07-22 DIAGNOSIS — I21.4 NON Q WAVE MYOCARDIAL INFARCTION (MULTI): ICD-10-CM

## 2025-07-22 DIAGNOSIS — E78.5 HYPERLIPIDEMIA, UNSPECIFIED HYPERLIPIDEMIA TYPE: ICD-10-CM

## 2025-07-22 DIAGNOSIS — R06.02 SHORTNESS OF BREATH: ICD-10-CM

## 2025-07-22 RX ORDER — ATORVASTATIN CALCIUM 80 MG/1
80 TABLET, FILM COATED ORAL NIGHTLY
Qty: 90 TABLET | Refills: 3 | Status: SHIPPED | OUTPATIENT
Start: 2025-07-22

## 2025-07-22 RX ORDER — CLOPIDOGREL BISULFATE 75 MG/1
75 TABLET ORAL DAILY
Qty: 90 TABLET | Refills: 3 | Status: SHIPPED | OUTPATIENT
Start: 2025-07-22

## 2025-07-22 RX ORDER — HYDROCHLOROTHIAZIDE 12.5 MG/1
12.5 CAPSULE ORAL EVERY MORNING
Qty: 90 CAPSULE | Refills: 3 | Status: SHIPPED | OUTPATIENT
Start: 2025-07-22

## 2025-07-22 RX ORDER — CARVEDILOL 6.25 MG/1
6.25 TABLET ORAL 2 TIMES DAILY
Qty: 180 TABLET | Refills: 3 | Status: SHIPPED | OUTPATIENT
Start: 2025-07-22

## 2025-10-21 ENCOUNTER — APPOINTMENT (OUTPATIENT)
Dept: CARDIOLOGY | Facility: CLINIC | Age: 70
End: 2025-10-21
Payer: MEDICARE